# Patient Record
Sex: MALE | Race: WHITE | NOT HISPANIC OR LATINO | Employment: OTHER | URBAN - NONMETROPOLITAN AREA
[De-identification: names, ages, dates, MRNs, and addresses within clinical notes are randomized per-mention and may not be internally consistent; named-entity substitution may affect disease eponyms.]

---

## 2018-01-29 ENCOUNTER — DOCUMENTATION ONLY (OUTPATIENT)
Dept: FAMILY MEDICINE | Facility: OTHER | Age: 73
End: 2018-01-29

## 2018-01-29 PROBLEM — K58.9 IRRITABLE BOWEL SYNDROME: Status: ACTIVE | Noted: 2018-01-29

## 2018-01-29 PROBLEM — Q74.2 CONGENITAL ANOMALIES OF FOOT, NOT ELSEWHERE CLASSIFIED: Status: ACTIVE | Noted: 2018-01-29

## 2018-01-29 PROBLEM — E11.9 DIABETES MELLITUS WITHOUT COMPLICATION (H): Status: ACTIVE | Noted: 2018-01-29

## 2018-01-29 PROBLEM — G47.00 INSOMNIA: Status: ACTIVE | Noted: 2018-01-29

## 2018-01-29 PROBLEM — Z86.79 PERSONAL HISTORY OF OTHER DISEASES OF CIRCULATORY SYSTEM: Status: ACTIVE | Noted: 2018-01-29

## 2018-01-29 PROBLEM — R74.01 NONSPECIFIC ELEVATION OF LEVELS OF TRANSAMINASE OR LACTIC ACID DEHYDROGENASE (LDH): Status: ACTIVE | Noted: 2018-01-29

## 2018-01-29 PROBLEM — E78.5 HYPERLIPIDEMIA: Status: ACTIVE | Noted: 2018-01-29

## 2018-01-29 PROBLEM — G56.00 CARPAL TUNNEL SYNDROME: Status: ACTIVE | Noted: 2018-01-29

## 2018-01-29 PROBLEM — I10 ESSENTIAL HYPERTENSION: Status: ACTIVE | Noted: 2018-01-29

## 2018-01-29 PROBLEM — M25.519 PAIN IN JOINT, SHOULDER REGION: Status: ACTIVE | Noted: 2018-01-29

## 2018-01-29 PROBLEM — L25.9 CONTACT DERMATITIS AND ECZEMA: Status: ACTIVE | Noted: 2018-01-29

## 2018-01-29 PROBLEM — R74.02 NONSPECIFIC ELEVATION OF LEVELS OF TRANSAMINASE OR LACTIC ACID DEHYDROGENASE (LDH): Status: ACTIVE | Noted: 2018-01-29

## 2018-01-29 PROBLEM — Z86.0100 HISTORY OF COLONIC POLYPS: Status: ACTIVE | Noted: 2018-01-29

## 2018-01-29 PROBLEM — J98.4 OTHER DISEASES OF LUNG, NOT ELSEWHERE CLASSIFIED: Status: ACTIVE | Noted: 2018-01-29

## 2018-01-29 PROBLEM — M19.90 OSTEOARTHRITIS: Status: ACTIVE | Noted: 2018-01-29

## 2018-01-29 PROBLEM — N52.9 IMPOTENCE OF ORGANIC ORIGIN: Status: ACTIVE | Noted: 2018-01-29

## 2018-01-29 PROBLEM — J45.909 INTRINSIC ASTHMA: Status: ACTIVE | Noted: 2018-01-29

## 2018-01-29 RX ORDER — OXYCODONE AND ACETAMINOPHEN 5; 325 MG/1; MG/1
1-2 TABLET ORAL EVERY 4 HOURS PRN
COMMUNITY
Start: 2014-03-03

## 2018-01-29 RX ORDER — ROSUVASTATIN CALCIUM 20 MG/1
20 TABLET, COATED ORAL AT BEDTIME
COMMUNITY
Start: 2014-02-26 | End: 2019-10-07

## 2018-01-29 RX ORDER — ALBUTEROL SULFATE 90 UG/1
2 AEROSOL, METERED RESPIRATORY (INHALATION) 4 TIMES DAILY PRN
COMMUNITY
Start: 2014-02-26 | End: 2019-10-07

## 2018-01-29 RX ORDER — CHLORAL HYDRATE 500 MG
3 CAPSULE ORAL DAILY
COMMUNITY
Start: 2014-02-26 | End: 2019-10-07

## 2018-01-29 RX ORDER — DIPHENOXYLATE HYDROCHLORIDE AND ATROPINE SULFATE 2.5; .025 MG/1; MG/1
1 TABLET ORAL DAILY
COMMUNITY
Start: 2014-02-26 | End: 2019-10-07

## 2018-01-29 RX ORDER — CLOTRIMAZOLE AND BETAMETHASONE DIPROPIONATE 10; .64 MG/G; MG/G
CREAM TOPICAL
COMMUNITY
Start: 2014-02-26 | End: 2019-10-07

## 2018-01-29 RX ORDER — DILTIAZEM HYDROCHLORIDE 180 MG/1
180 CAPSULE, COATED, EXTENDED RELEASE ORAL DAILY
COMMUNITY
Start: 2014-02-26 | End: 2019-10-07

## 2018-01-29 RX ORDER — INSULIN GLARGINE 100 [IU]/ML
INJECTION, SOLUTION SUBCUTANEOUS
COMMUNITY
Start: 2014-02-26 | End: 2019-10-07

## 2018-01-29 RX ORDER — LISINOPRIL 10 MG/1
10 TABLET ORAL DAILY
COMMUNITY
Start: 2014-02-26 | End: 2019-10-07

## 2018-05-24 ENCOUNTER — TRANSFERRED RECORDS (OUTPATIENT)
Dept: HEALTH INFORMATION MANAGEMENT | Facility: OTHER | Age: 73
End: 2018-05-24

## 2018-10-17 ENCOUNTER — OFFICE VISIT (OUTPATIENT)
Dept: UROLOGY | Facility: OTHER | Age: 73
End: 2018-10-17
Attending: UROLOGY
Payer: MEDICARE

## 2018-10-17 VITALS — HEART RATE: 80 BPM | WEIGHT: 212 LBS | TEMPERATURE: 97.5 F | BODY MASS INDEX: 31.77 KG/M2

## 2018-10-17 DIAGNOSIS — Z85.51 PERSONAL HISTORY OF MALIGNANT NEOPLASM OF BLADDER: Primary | ICD-10-CM

## 2018-10-17 PROCEDURE — G0463 HOSPITAL OUTPT CLINIC VISIT: HCPCS | Mod: 25

## 2018-10-17 PROCEDURE — 52000 CYSTOURETHROSCOPY: CPT | Performed by: UROLOGY

## 2018-10-17 ASSESSMENT — PAIN SCALES - GENERAL: PAINLEVEL: NO PAIN (0)

## 2018-10-17 NOTE — PROGRESS NOTES
PCP:  Dr Dewitt    Preprocedure diagnosis   History of bladder cancer     Postprocedure diagnosis   History of bladder cancer     Procedure   Flexible Cystourethroscopy     Surgeon   Dusty Pierce MD     Anesthesia   2% lidocaine jelly intraurethrally     Complications   None     Indications   73 y.o. male undergoing a flexible cystoscopy for the above mentioned indications.   His first and last tumor was resected 3/2014 and was LGTa    Findings   Cystoscopic findings included a normal anterior urethra.   There was not a prominent median lobe.   The lateral lobes were obstructive in appearance.   The bladder appeared to be normal capacity.   There were no tumors, stones or foreign bodies.   The orifices were slit-shaped and in their normal location.     Previous resection scars were identified bilaterally along the lateral walls left and right.    Procedure   The patient was placed in supine position and prepped and draped in sterile fashion with lidocaine jelly per urethra for anesthesia.   I passed a lubricated 14F flexible cystoscope through the penile urethra and into the bladder and the bladder was completely visualized.   The cystoscope was retroflexed and the bladder neck and prostate visualized.   The cystoscope was slowly withdrawn while visualizing the urethra and the procedure terminated.   The patient tolerated the procedure well.     Assessment & Plan   I emphasized the importance of follow-up for his history of bladder cancer.  I explained that bladder cancer has a relatively high likelihood of recurrence.  Follow up annually for surveillance cystoscopy

## 2018-10-17 NOTE — MR AVS SNAPSHOT
After Visit Summary   10/17/2018    Sourav Phillips    MRN: [id number]           Patient Information     Date Of Birth          1945        Visit Information        Provider Department      10/17/2018 1:00 PM Dusty Pierce MD [facility]        Care Instructions    Home Care after Cystoscopy  Follow these guidelines for your care after your procedure.    Activity  No limitations    Bathing or showering  No limitations    Symptoms  You may notice some burning with urination but this usually resolves after 1-2 days.  You may also notice small amounts of blood in your urine.  Please increase water intake for the next few days to help with these symptoms.    Contacts  General Questions: [phone]  Appointments:  [phone]  Emergencies:  [phone]    When to call the clinic  If you develop any of the following symptoms please call the clinic immediately.  If the clinic is closed please be seen at an urgent care clinic or the Emergency Department.  - Burning with urination that worsens after 2 days  - Unable to urinate causing severe pelvic pain  - Fevers of greater than 101 degrees F  - Flank pain that is not responding to pain medication    Follow up  Please follow up as discussed at the appointment.          Follow-ups after your visit        Who to contact     If you have questions or need follow up information about today's clinic visit or your schedule please contact [facility] directly at [phone].  Normal or non-critical lab and imaging results will be communicated to you by MyChart, letter or phone within 4 business days after the clinic has received the results. If you do not hear from us within 7 days, please contact the clinic through MyChart or phone. If you have a critical or abnormal lab result, we will notify you by phone as soon as possible.  Submit refill requests through [employer] or call your pharmacy and they will forward the refill  request to us. Please allow 3 business days for your refill to be completed.          Additional Information About Your Visit        Care EveryWhere ID     This is your Care EveryWhere ID. This could be used by other organizations to access your [place] medical records  [id number]        Your Vitals Were     Pulse Temperature BMI (Body Mass Index)             80 97.5  F (36.4  C) (Tympanic) 31.77 kg/m2          Blood Pressure from Last 3 Encounters:   03/13/14 126/82   01/16/14 131/76   01/09/14 126/76    Weight from Last 3 Encounters:   10/17/18 96.2 kg (212 lb)   09/09/14 102.1 kg (225 lb)   06/06/14 100.1 kg (220 lb 9.6 oz)              Today, you had the following     No orders found for display       Primary Care Provider Office Phone # Fax #    Kermit Rigoberto Dewitt [phone]       [facility] [address] [place] [address]        Equal Access to Services     [facility]: Hadii aad ku hadasho Soomaali, waaxda luqadaha, qaybta kaalmada adeegyada, waxay idiin haystephann canelo saravia . So [facility] [phone].    ATENCIÓN: Si habla español, tiene a still disposición servicios gratuitos de asistencia lingüística. [facility] [phone].    We comply with applicable federal civil rights laws and [place] laws. We do not discriminate on the basis of race, color, national origin, age, disability, sex, sexual orientation, or gender identity.            Thank you!     Thank you for choosing [facility]  for your care. Our goal is always to provide you with excellent care. Hearing back from our patients is one way we can continue to improve our services. Please take a few minutes to complete the written survey that you may receive in the mail after your visit with us. Thank you!             Your Updated Medication List - Protect others around you: Learn how to safely use, store and throw away your medicines at www.disposemymeds.org.          This list is accurate  as of 10/17/18  1:18 PM.  Always use your most recent med list.                   Brand Name Dispense Instructions for use Diagnosis    * aspirin 81 MG EC tablet      Take 81 mg by mouth daily        * aspirin 81 MG tablet      Take 81 mg by mouth daily        CALCIUM 500+D PO      Take 1 tablet by mouth 2 times daily        * clotrimazole-betamethasone cream    LOTRISONE     Apply topically 2 times daily        * clotrimazole-betamethasone cream    LOTRISONE          * CRESTOR 20 MG tablet   Generic drug:  rosuvastatin      Take by mouth daily At bedtime        * rosuvastatin 20 MG tablet    CRESTOR     Take 20 mg by mouth At Bedtime        * Cyanocobalamin 1000 MCG Caps      Take 1 capsule by mouth daily        * RA VITAMIN B-12 TR 1000 MCG Tbcr   Generic drug:  cyanocobalamin      Take 1,000 mcg by mouth daily        diltiazem 180 MG 24 hr capsule    DILACOR XR     Take 180 mg by mouth daily        diltiazem 180 MG 24 hr capsule      Take 180 mg by mouth daily        * fish oil-omega-3 fatty acids 1000 MG capsule      Take 3 g by mouth daily        * fish oil-omega-3 fatty acids 1000 MG capsule      Take 3 capsules by mouth daily        * fluticasone-salmeterol 100-50 MCG/DOSE diskus inhaler    ADVAIR     Inhale 1 puff into the lungs every 12 hours        * ADVAIR DISKUS 100-50 MCG/DOSE diskus inhaler   Generic drug:  fluticasone-salmeterol      Inhale 1 puff into the lungs every 12 hours        * humaLOG 100 UNIT/ML injection   Generic drug:  insulin lispro      Inject Subcutaneous 3 times daily (before meals) Use correction scale        * insulin lispro 100 UNIT/ML injection    HumaLOG     Take 1 unit per 10 carbs for meals- average 25 units daily        * JANUMET  MG per tablet   Generic drug:  sitagliptin-metFORMIN      Take 1 tablet by mouth 2 times daily (with meals)        * sitagliptin-metFORMIN  MG per tablet    JANUMET     Take 1 tablet by mouth 2 times daily (with meals)        * LANTUS  VIAL 100 UNIT/ML injection   Generic drug:  insulin glargine      Inject 40 Units Subcutaneous 2 times daily        * insulin glargine 100 UNIT/ML injection    LANTUS     Inject 40 units in the morning and 40 units every evening as directed        * lisinopril 10 MG tablet    PRINIVIL/ZESTRIL     Take 10 mg by mouth daily        * lisinopril 10 MG tablet    PRINIVIL/ZESTRIL     Take 10 mg by mouth daily        magnesium 100 MG Caps      Take 1 tablet by mouth daily        MAGNESIUM PO      Take 100 mg by mouth daily        melatonin 5 MG tablet      Take 5 mg by mouth nightly as needed for sleep        * MULTIPLE VITAMIN PO      Take 1 tablet by mouth daily        * MULTI-VITAMINS Tabs      Take 1 tablet by mouth daily        oxyCODONE-acetaminophen 5-325 MG per tablet    PERCOCET     Take 1-2 tablets by mouth every 4 hours as needed for pain Max Acetaminophen (Tylenol) dose: 4000 mg in 24 hours        traZODone 50 MG tablet    DESYREL     Take 1 tablet by mouth At Bedtime As needed        * VENTOLIN  (90 Base) MCG/ACT inhaler   Generic drug:  albuterol      Inhale 2 puffs into the lungs every 6 hours As needed        * albuterol 108 (90 Base) MCG/ACT inhaler    PROAIR HFA/PROVENTIL HFA/VENTOLIN HFA     Inhale 2 puffs into the lungs 4 times daily as needed        * Notice:  This list has 24 medication(s) that are the same as other medications prescribed for you. Read the directions carefully, and ask your doctor or other care provider to review them with you.

## 2018-10-17 NOTE — NURSING NOTE
Patient positioned in supine position, perineum area prepped with chlorhexidene Gluconate and patient draped per sterile technique. Per verbal order read back by Dusty Pierce MD, Urojet 10mL 2% lidocaine jelly to be instilled into urethra.  Urojet- 10ml 2% Lidocaine jelly instilled into the urethra.    Urojet 2%  Lot#: [id number]  Expiration date: 4/20  : [employer]  NDC: [address]-5    [place] Protocol    A. Pre-procedure verification complete Yes  1-relevant information / documentation available, reviewed and properly matched to the patient; 2-consent accurate and complete, 3-equipment and supplies available    B. Site marking complete N/A  Site marked if not in continuous attendance with patient    C. TIME OUT completed Yes  Time Out was conducted just prior to starting procedure to verify the eight required elements: 1-patient identity, 2-consent accurate and complete, 3-position, 4-correct side/site marked (if applicable), 5-procedure, 6-relevant images / results properly labeled and displayed (if applicable), 7-antibiotics / irrigation fluids (if applicable), 8-safety precautions.    After procedure perineum area rinsed. Discharge instructions reviewed with patient. Patient verbalized understanding of discharge instructions and discharged ambulatory.  Kelsi Cisneros..................10/17/2018  1:24 PM

## 2018-10-17 NOTE — PATIENT INSTRUCTIONS

## 2018-10-17 NOTE — LETTER
Kermit Dewitt MD  [address]   [address]  [place] [address]      October 17, 2018  MRN: [id number]    Dear Dr. Dewitt,          I saw Sourav Phillips regarding his history of bladder cancer.   Unfortunately he was lost to follow-up 2 years ago.  He follows up today for cystoscopy and I am happy to report that his cystoscopy was negative.  I copied my note below from today's clinic visit for your records.  Please feel free to contact me with any questions      Warm regards,            Dusty Pierce MD, PharmD, FACS  Urologist  [address] [place] [place] [address]  Appointments: [phone]                      PCP:  Dr Dewitt    Preprocedure diagnosis   History of bladder cancer     Postprocedure diagnosis   History of bladder cancer     Procedure   Flexible Cystourethroscopy     Surgeon   Dusty Pierce MD     Anesthesia   2% lidocaine jelly intraurethrally     Complications   None     Indications   73 y.o. male undergoing a flexible cystoscopy for the above mentioned indications.   His first and last tumor was resected 3/2014 and was LGTa    Findings   Cystoscopic findings included a normal anterior urethra.   There was not a prominent median lobe.   The lateral lobes were obstructive in appearance.   The bladder appeared to be normal capacity.   There were no tumors, stones or foreign bodies.   The orifices were slit-shaped and in their normal location.     Previous resection scars were identified bilaterally along the lateral walls left and right.    Procedure   The patient was placed in supine position and prepped and draped in sterile fashion with lidocaine jelly per urethra for anesthesia.   I passed a lubricated 14F flexible cystoscope through the penile urethra and into the bladder and the bladder was completely visualized.   The cystoscope was retroflexed and the bladder neck and prostate visualized.   The cystoscope was slowly withdrawn while visualizing the urethra  and the procedure terminated.   The patient tolerated the procedure well.     Assessment & Plan   I emphasized the importance of follow-up for his history of bladder cancer.  I explained that bladder cancer has a relatively high likelihood of recurrence.  Follow up annually for surveillance cystoscopy

## 2019-10-07 ENCOUNTER — OFFICE VISIT (OUTPATIENT)
Dept: UROLOGY | Facility: OTHER | Age: 74
End: 2019-10-07
Attending: UROLOGY
Payer: MEDICARE

## 2019-10-07 VITALS — WEIGHT: 202 LBS | HEART RATE: 92 BPM | RESPIRATION RATE: 12 BRPM | BODY MASS INDEX: 30.27 KG/M2

## 2019-10-07 DIAGNOSIS — Z85.51 HISTORY OF BLADDER CANCER: Primary | ICD-10-CM

## 2019-10-07 PROCEDURE — 52000 CYSTOURETHROSCOPY: CPT | Performed by: UROLOGY

## 2019-10-07 PROCEDURE — G0463 HOSPITAL OUTPT CLINIC VISIT: HCPCS | Mod: 25

## 2019-10-07 ASSESSMENT — PAIN SCALES - GENERAL: PAINLEVEL: NO PAIN (0)

## 2019-10-07 NOTE — PATIENT INSTRUCTIONS

## 2019-10-07 NOTE — PROGRESS NOTES
PCP:  Dr Dewitt    Preprocedure diagnosis   History of bladder cancer     Postprocedure diagnosis   History of bladder cancer     Procedure   Flexible Cystourethroscopy     Surgeon   Dusty Pierce MD     Anesthesia   2% lidocaine jelly intraurethrally     Complications   None     Indications   The patient is undergoing a flexible cystoscopy for the above mentioned indications.   His first and last tumor was resected 3/2014 and was LGTa    Findings   Cystoscopic findings included a normal anterior urethra.   There was not a prominent median lobe.   The lateral lobes were obstructive in appearance.   The bladder appeared to be normal capacity.   There were no tumors, stones or foreign bodies.   The orifices were slit-shaped and in their normal location.     Previous resection scars were identified bilaterally along the lateral walls left and right.    Procedure   The patient was placed in supine position and prepped and draped in sterile fashion with lidocaine jelly per urethra for anesthesia.   I passed a lubricated 14F flexible cystoscope through the penile urethra and into the bladder and the bladder was completely visualized.   The cystoscope was retroflexed and the bladder neck and prostate visualized.   The cystoscope was slowly withdrawn while visualizing the urethra and the procedure terminated.   The patient tolerated the procedure well.     Assessment & Plan   I emphasized the importance of follow-up for his history of bladder cancer.  I explained that bladder cancer has a relatively high likelihood of recurrence.  Follow up annually for surveillance cystoscopy

## 2019-10-07 NOTE — NURSING NOTE
Patient positioned in supine position, perineum area prepped with chlorhexidene Gluconate and patient draped per sterile technique. Per verbal order read back by Dusty Pierce MD, Urojet 10mL 2% lidocaine jelly to be instilled into urethra.  Urojet- 10ml 2% Lidocaine jelly instilled into the urethra.    Urojet 2%  Lot#: [id number]  Expiration date: 5/21  : [employer]  NDC: [address]-1    [place] Protocol    A. Pre-procedure verification complete Yes  1-relevant information / documentation available, reviewed and properly matched to the patient; 2-consent accurate and complete, 3-equipment and supplies available    B. Site marking complete N/A  Site marked if not in continuous attendance with patient    C. TIME OUT completed Yes  Time Out was conducted just prior to starting procedure to verify the eight required elements: 1-patient identity, 2-consent accurate and complete, 3-position, 4-correct side/site marked (if applicable), 5-procedure, 6-relevant images / results properly labeled and displayed (if applicable), 7-antibiotics / irrigation fluids (if applicable), 8-safety precautions.    After procedure perineum area rinsed. Discharge instructions reviewed with patient. Patient verbalized understanding of discharge instructions and discharged ambulatory.  Kelsi Cisneros RN..................10/7/2019  2:39 PM

## 2020-10-07 ENCOUNTER — OFFICE VISIT (OUTPATIENT)
Dept: UROLOGY | Facility: OTHER | Age: 75
End: 2020-10-07
Attending: UROLOGY
Payer: MEDICARE

## 2020-10-07 VITALS — RESPIRATION RATE: 24 BRPM | HEART RATE: 76 BPM | WEIGHT: 192.8 LBS | BODY MASS INDEX: 28.89 KG/M2

## 2020-10-07 DIAGNOSIS — Z85.51 PERSONAL HISTORY OF MALIGNANT NEOPLASM OF BLADDER: Primary | ICD-10-CM

## 2020-10-07 PROCEDURE — 99212 OFFICE O/P EST SF 10 MIN: CPT | Mod: 25 | Performed by: UROLOGY

## 2020-10-07 PROCEDURE — G0463 HOSPITAL OUTPT CLINIC VISIT: HCPCS | Mod: 25

## 2020-10-07 PROCEDURE — 52000 CYSTOURETHROSCOPY: CPT | Performed by: UROLOGY

## 2020-10-07 SDOH — HEALTH STABILITY: MENTAL HEALTH: HOW OFTEN DO YOU HAVE 6 OR MORE DRINKS ON ONE OCCASION?: NOT ASKED

## 2020-10-07 SDOH — HEALTH STABILITY: MENTAL HEALTH: HOW OFTEN DO YOU HAVE A DRINK CONTAINING ALCOHOL?: NOT ASKED

## 2020-10-07 SDOH — HEALTH STABILITY: MENTAL HEALTH: HOW MANY STANDARD DRINKS CONTAINING ALCOHOL DO YOU HAVE ON A TYPICAL DAY?: NOT ASKED

## 2020-10-07 ASSESSMENT — PAIN SCALES - GENERAL: PAINLEVEL: NO PAIN (0)

## 2020-10-07 NOTE — PROGRESS NOTES
PCP:  Dr Dewitt    Preprocedure diagnosis   History of bladder cancer     Postprocedure diagnosis   History of bladder cancer   Abnormal left anterolateral wall papillary finding    Procedure   Flexible Cystourethroscopy     Surgeon   Dusty Pierce MD     Anesthesia   2% lidocaine jelly intraurethrally     Complications   None     Indications   The patient is undergoing a flexible cystoscopy for the above mentioned indications.   His first and last tumor was resected 3/2014 and was LGTa    Findings   Cystoscopic findings included a normal anterior urethra.   There was not a prominent median lobe.   The lateral lobes were obstructive in appearance.   The bladder appeared to be normal capacity.   There were no tumors, stones or foreign bodies.   There was a flat papillary area in the left anterolateral wall.  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  There was no three-dimensional tumor identified  The orifices were slit-shaped and in their normal location.     Previous resection scars were identified bilaterally along the lateral walls left and right.    Procedure   The patient was placed in supine position and prepped and draped in sterile fashion with lidocaine jelly per urethra for anesthesia.   I passed a lubricated 14F flexible cystoscope through the penile urethra and into the bladder and the bladder was completely visualized.   The cystoscope was retroflexed and the bladder neck and prostate visualized.   The cystoscope was slowly withdrawn while visualizing the urethra and the procedure terminated.   The patient tolerated the procedure well.     Assessment & Plan   I emphasized the importance of follow-up for his history of bladder cancer.  I explained that bladder cancer has a relatively high likelihood of recurrence.  Follow up annually for surveillance cystoscopy for surveillance cystoscopy in 3 months to evaluate the left anterolateral wall abnormal urothelial  findings from today to assure there is no progression or worsening appearance to prompt biopsy            I spent 10 minutes on this patient's visit (exclusive of separately billed services/procedures) and over half of this time was spent in face-to-face counseling regarding his diagnosis, treatment options with emphasis on  risks and benefits of each, prognosis and importance of compliance.

## 2020-10-07 NOTE — PATIENT INSTRUCTIONS

## 2020-10-07 NOTE — NURSING NOTE
Patient positioned in supine position, perineum area prepped with chlorhexidene Gluconate and patient draped per sterile technique. Per verbal order read back by Dusty Pierce MD, Urojet 10mL 2% lidocaine jelly to be instilled into urethra.  Urojet- 10ml 2% Lidocaine jelly instilled into the urethra.    Urojet 2%  Lot#: [id number]  Expiration date: 5/22  : [employer]  NDC: [address]-4    [place] Protocol    A. Pre-procedure verification complete Yes  1-relevant information / documentation available, reviewed and properly matched to the patient; 2-consent accurate and complete, 3-equipment and supplies available    B. Site marking complete N/A  Site marked if not in continuous attendance with patient    C. TIME OUT completed Yes  Time Out was conducted just prior to starting procedure to verify the eight required elements: 1-patient identity, 2-consent accurate and complete, 3-position, 4-correct side/site marked (if applicable), 5-procedure, 6-relevant images / results properly labeled and displayed (if applicable), 7-antibiotics / irrigation fluids (if applicable), 8-safety precautions.    After procedure perineum area rinsed. Discharge instructions reviewed with patient. Patient verbalized understanding of discharge instructions and discharged ambulatory.  Kelsi Cisneros RN..................10/7/2020  11:02 AM

## 2021-01-27 ENCOUNTER — OFFICE VISIT (OUTPATIENT)
Dept: UROLOGY | Facility: OTHER | Age: 76
End: 2021-01-27
Attending: UROLOGY
Payer: MEDICARE

## 2021-01-27 VITALS — RESPIRATION RATE: 20 BRPM | HEART RATE: 84 BPM | WEIGHT: 192.2 LBS | BODY MASS INDEX: 28.8 KG/M2

## 2021-01-27 DIAGNOSIS — Z85.51 PERSONAL HISTORY OF MALIGNANT NEOPLASM OF BLADDER: Primary | ICD-10-CM

## 2021-01-27 PROCEDURE — 52000 CYSTOURETHROSCOPY: CPT | Performed by: UROLOGY

## 2021-01-27 PROCEDURE — G0463 HOSPITAL OUTPT CLINIC VISIT: HCPCS | Mod: 25

## 2021-01-27 ASSESSMENT — PAIN SCALES - GENERAL: PAINLEVEL: NO PAIN (0)

## 2021-01-27 NOTE — PROGRESS NOTES
PCP:  Dr Dewitt    Preprocedure diagnosis   History of bladder cancer     Postprocedure diagnosis   History of bladder cancer     Procedure   Flexible Cystourethroscopy     Surgeon   Dusty Pierce MD     Anesthesia   2% lidocaine jelly intraurethrally     Complications   None     Indications   The patient is undergoing a flexible cystoscopy for the above mentioned indications.   His first and last tumor was resected 3/2014 and was LGTa    Findings   Cystoscopic findings included a normal anterior urethra.   There was not a prominent median lobe.   The lateral lobes were obstructive in appearance.   The bladder appeared to be normal capacity.   There were no tumors, stones or foreign bodies.   The deviously identified flat papillary area in the left anterolateral wall is completely stable compared to the cystoscopy procedure 3 months ago.  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  There was no three-dimensional tumor identified  The orifices were slit-shaped and in their normal location.     Procedure   The patient was placed in supine position and prepped and draped in sterile fashion with lidocaine jelly per urethra for anesthesia.   I passed a lubricated 14F flexible cystoscope through the penile urethra and into the bladder and the bladder was completely visualized.   The cystoscope was retroflexed and the bladder neck and prostate visualized.   The cystoscope was slowly withdrawn while visualizing the urethra and the procedure terminated.   The patient tolerated the procedure well.     Assessment & Plan   I emphasized the importance of follow-up for his history of bladder cancer.  I explained that bladder cancer has a relatively high risk for recurrence.  Follow up annually for surveillance cystoscopy.

## 2021-01-27 NOTE — NURSING NOTE
Patient positioned in supine position, perineum area prepped with chlorhexidene Gluconate and patient draped per sterile technique. Per verbal order read back by Dusty Pierce MD, Urojet 10mL 2% lidocaine jelly to be instilled into urethra.  Urojet- 10ml 2% Lidocaine jelly instilled into the urethra.    Urojet 2%  Lot#: [id number]  Expiration date: 8/22  : [employer]  NDC: [address]-7    [place] Protocol    A. Pre-procedure verification complete Yes  1-relevant information / documentation available, reviewed and properly matched to the patient; 2-consent accurate and complete, 3-equipment and supplies available    B. Site marking complete N/A  Site marked if not in continuous attendance with patient    C. TIME OUT completed Yes  Time Out was conducted just prior to starting procedure to verify the eight required elements: 1-patient identity, 2-consent accurate and complete, 3-position, 4-correct side/site marked (if applicable), 5-procedure, 6-relevant images / results properly labeled and displayed (if applicable), 7-antibiotics / irrigation fluids (if applicable), 8-safety precautions.    After procedure perineum area rinsed. Discharge instructions reviewed with patient. Patient verbalized understanding of discharge instructions and discharged ambulatory.  Kelsi Cisneros RN..................1/27/2021  10:25 AM

## 2021-01-27 NOTE — PATIENT INSTRUCTIONS

## 2022-03-16 ENCOUNTER — OFFICE VISIT (OUTPATIENT)
Dept: UROLOGY | Facility: OTHER | Age: 77
End: 2022-03-16
Attending: UROLOGY
Payer: MEDICARE

## 2022-03-16 VITALS
WEIGHT: 199.4 LBS | TEMPERATURE: 98.5 F | RESPIRATION RATE: 16 BRPM | OXYGEN SATURATION: 97 % | HEART RATE: 106 BPM | BODY MASS INDEX: 29.88 KG/M2

## 2022-03-16 DIAGNOSIS — Z85.51 PERSONAL HISTORY OF MALIGNANT NEOPLASM OF BLADDER: Primary | ICD-10-CM

## 2022-03-16 PROCEDURE — 52000 CYSTOURETHROSCOPY: CPT | Performed by: UROLOGY

## 2022-03-16 PROCEDURE — G0463 HOSPITAL OUTPT CLINIC VISIT: HCPCS | Mod: 25

## 2022-03-16 ASSESSMENT — PAIN SCALES - GENERAL: PAINLEVEL: NO PAIN (0)

## 2022-03-16 NOTE — PROGRESS NOTES
PCP:  Dr Dewitt    Preprocedure diagnosis   History of bladder cancer     Postprocedure diagnosis   History of bladder cancer     Procedure   Flexible Cystourethroscopy     Surgeon   Dusty Pierce MD     Anesthesia   2% lidocaine jelly intraurethrally     Complications   None     Indications   The patient is undergoing a flexible cystoscopy for the above mentioned indications.   His first and last tumor was resected 3/2014 and was LGTa    Findings   Cystoscopic findings included a normal anterior urethra.   There was not a prominent median lobe.   The lateral lobes were obstructive in appearance.   The bladder appeared to be normal capacity.   There were no tumors, stones or foreign bodies.   The deviously identified flat papillary area in the left anterolateral wall is completely stable compared to the cystoscopy procedure 3 months ago.  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  There was no three-dimensional tumor identified  The orifices were slit-shaped and in their normal location.     Procedure   The patient was placed in supine position and prepped and draped in sterile fashion with lidocaine jelly per urethra for anesthesia.   I passed a lubricated 14F flexible cystoscope through the penile urethra and into the bladder and the bladder was completely visualized.   The cystoscope was retroflexed and the bladder neck and prostate visualized.   The cystoscope was slowly withdrawn while visualizing the urethra and the procedure terminated.   The patient tolerated the procedure well.     Assessment & Plan   Mr. Ko is a 76-year-old male with a history of bladder cancer who underwent annual surveillance cystoscopy today which was negative.

## 2022-03-16 NOTE — NURSING NOTE
Patient positioned in supine position, perineum area prepped with chlorhexidene Gluconate and patient draped per sterile technique. Per verbal order read back by Dusty Pierce MD, Urojet 10mL 2% lidocaine jelly to be instilled into urethra.  Urojet- 10ml 2% Lidocaine jelly instilled into the urethra.    Urojet 2%  Lot#: [id number]  Expiration date: 8/2023  : [employer]  NDC: [address]-3    [place] Protocol    A. Pre-procedure verification complete Yes  1-relevant information / documentation available, reviewed and properly matched to the patient; 2-consent accurate and complete, 3-equipment and supplies available    B. Site marking complete N/A  Site marked if not in continuous attendance with patient    C. TIME OUT completed Yes  Time Out was conducted just prior to starting procedure to verify the eight required elements: 1-patient identity, 2-consent accurate and complete, 3-position, 4-correct side/site marked (if applicable), 5-procedure, 6-relevant images / results properly labeled and displayed (if applicable), 7-antibiotics / irrigation fluids (if applicable), 8-safety precautions.    After procedure perineum area rinsed. Discharge instructions reviewed with patient. Patient verbalized understanding of discharge instructions and discharged ambulatory.  Radha Ruiz LPN..................3/16/2022  2:11 PM

## 2022-03-16 NOTE — PATIENT INSTRUCTIONS
